# Patient Record
Sex: MALE | Race: WHITE | NOT HISPANIC OR LATINO | Employment: OTHER | URBAN - METROPOLITAN AREA
[De-identification: names, ages, dates, MRNs, and addresses within clinical notes are randomized per-mention and may not be internally consistent; named-entity substitution may affect disease eponyms.]

---

## 2023-11-06 PROBLEM — N40.1 BENIGN PROSTATIC HYPERPLASIA WITH NOCTURIA: Status: ACTIVE | Noted: 2023-11-06

## 2023-11-06 PROBLEM — B37.2 CANDIDA INFECTION OF FLEXURAL SKIN: Status: ACTIVE | Noted: 2023-11-06

## 2023-11-06 PROBLEM — R60.0 LEG EDEMA: Status: ACTIVE | Noted: 2023-11-06

## 2023-11-06 PROBLEM — E03.9 HYPOTHYROIDISM: Status: ACTIVE | Noted: 2023-11-06

## 2023-11-06 PROBLEM — L85.3 XEROSIS CUTIS: Status: ACTIVE | Noted: 2023-11-06

## 2023-11-06 PROBLEM — E11.9 CONTROLLED DIABETES MELLITUS (MULTI): Status: ACTIVE | Noted: 2023-11-06

## 2023-11-06 PROBLEM — E66.3 OVERWEIGHT: Status: ACTIVE | Noted: 2023-11-06

## 2023-11-06 PROBLEM — E55.9 MILD VITAMIN D DEFICIENCY: Status: ACTIVE | Noted: 2023-11-06

## 2023-11-06 PROBLEM — E78.5 HYPERLIPEMIA: Status: ACTIVE | Noted: 2023-11-06

## 2023-11-06 PROBLEM — G47.33 OBSTRUCTIVE SLEEP APNEA: Status: ACTIVE | Noted: 2023-11-06

## 2023-11-06 PROBLEM — M19.071 PRIMARY OSTEOARTHRITIS OF BOTH FEET: Status: ACTIVE | Noted: 2023-11-06

## 2023-11-06 PROBLEM — M19.072 PRIMARY OSTEOARTHRITIS OF BOTH FEET: Status: ACTIVE | Noted: 2023-11-06

## 2023-11-06 PROBLEM — B35.1 ONYCHOMYCOSIS: Status: ACTIVE | Noted: 2023-11-06

## 2023-11-06 PROBLEM — K25.9 PYLORIC ULCER: Status: ACTIVE | Noted: 2023-11-06

## 2023-11-06 PROBLEM — I48.91 ATRIAL FIBRILLATION (MULTI): Status: ACTIVE | Noted: 2023-11-06

## 2023-11-06 PROBLEM — R35.1 BENIGN PROSTATIC HYPERPLASIA WITH NOCTURIA: Status: ACTIVE | Noted: 2023-11-06

## 2023-11-06 PROBLEM — I25.10 CORONARY ARTERY CALCIFICATION: Status: ACTIVE | Noted: 2023-11-06

## 2023-11-06 PROBLEM — R63.2 POLYPHAGIA: Status: ACTIVE | Noted: 2023-11-06

## 2023-11-06 PROBLEM — J45.909 ASTHMA (HHS-HCC): Status: ACTIVE | Noted: 2023-11-06

## 2023-11-06 PROBLEM — L60.0 ONYCHOCRYPTOSIS: Status: ACTIVE | Noted: 2023-11-06

## 2023-11-06 PROBLEM — Z01.810 PREOP CARDIOVASCULAR EXAM: Status: ACTIVE | Noted: 2023-11-06

## 2023-11-06 PROBLEM — I25.84 CORONARY ARTERY CALCIFICATION: Status: ACTIVE | Noted: 2023-11-06

## 2023-11-06 PROBLEM — I10 BENIGN ESSENTIAL HYPERTENSION: Status: ACTIVE | Noted: 2023-11-06

## 2023-11-06 PROBLEM — R04.0 EPISTAXIS: Status: ACTIVE | Noted: 2023-11-06

## 2023-11-06 RX ORDER — LEVOTHYROXINE SODIUM 112 UG/1
1 TABLET ORAL DAILY
COMMUNITY
Start: 2022-01-10

## 2023-11-06 RX ORDER — METFORMIN HYDROCHLORIDE 500 MG/1
TABLET ORAL 2 TIMES DAILY
COMMUNITY
Start: 2016-11-18

## 2023-11-06 RX ORDER — LANOLIN ALCOHOL/MO/W.PET/CERES
1 CREAM (GRAM) TOPICAL DAILY
COMMUNITY
Start: 2022-04-19

## 2023-11-06 RX ORDER — METOPROLOL SUCCINATE 50 MG/1
50 TABLET, EXTENDED RELEASE ORAL DAILY
COMMUNITY
Start: 2022-01-10

## 2023-11-06 RX ORDER — FLUTICASONE PROPIONATE AND SALMETEROL 250; 50 UG/1; UG/1
1 POWDER RESPIRATORY (INHALATION) EVERY 12 HOURS
COMMUNITY
Start: 2021-06-30

## 2023-11-06 RX ORDER — PANTOPRAZOLE SODIUM 20 MG/1
1 TABLET, DELAYED RELEASE ORAL DAILY
COMMUNITY
Start: 2022-03-31 | End: 2023-11-07 | Stop reason: ALTCHOICE

## 2023-11-06 RX ORDER — PRAVASTATIN SODIUM 40 MG/1
1 TABLET ORAL DAILY
COMMUNITY
Start: 2022-10-18

## 2023-11-06 RX ORDER — GABAPENTIN 100 MG/1
1 CAPSULE ORAL NIGHTLY
COMMUNITY
End: 2023-12-06 | Stop reason: DRUGHIGH

## 2023-11-06 RX ORDER — TAMSULOSIN HYDROCHLORIDE 0.4 MG/1
0.4 CAPSULE ORAL
COMMUNITY
End: 2023-12-06 | Stop reason: ALTCHOICE

## 2023-11-06 RX ORDER — POLYETHYLENE GLYCOL 3350 17 G/17G
POWDER, FOR SOLUTION ORAL
COMMUNITY
Start: 2022-04-04 | End: 2023-11-07 | Stop reason: ALTCHOICE

## 2023-11-07 ENCOUNTER — OFFICE VISIT (OUTPATIENT)
Dept: CARDIOLOGY | Facility: CLINIC | Age: 73
End: 2023-11-07
Payer: MEDICARE

## 2023-11-07 VITALS
WEIGHT: 254 LBS | BODY MASS INDEX: 38.49 KG/M2 | SYSTOLIC BLOOD PRESSURE: 112 MMHG | HEART RATE: 87 BPM | HEIGHT: 68 IN | OXYGEN SATURATION: 96 % | DIASTOLIC BLOOD PRESSURE: 62 MMHG

## 2023-11-07 DIAGNOSIS — R07.9 CHEST PAIN, CENTRAL: ICD-10-CM

## 2023-11-07 DIAGNOSIS — E78.00 PURE HYPERCHOLESTEROLEMIA: ICD-10-CM

## 2023-11-07 DIAGNOSIS — G47.33 OBSTRUCTIVE SLEEP APNEA: Primary | ICD-10-CM

## 2023-11-07 DIAGNOSIS — I10 BENIGN ESSENTIAL HYPERTENSION: ICD-10-CM

## 2023-11-07 DIAGNOSIS — I25.84 CORONARY ARTERY CALCIFICATION: ICD-10-CM

## 2023-11-07 DIAGNOSIS — G47.33 OBSTRUCTIVE SLEEP APNEA: ICD-10-CM

## 2023-11-07 DIAGNOSIS — I48.21 PERMANENT ATRIAL FIBRILLATION (MULTI): Primary | ICD-10-CM

## 2023-11-07 DIAGNOSIS — I25.10 CORONARY ARTERY CALCIFICATION: ICD-10-CM

## 2023-11-07 PROCEDURE — 3074F SYST BP LT 130 MM HG: CPT | Performed by: INTERNAL MEDICINE

## 2023-11-07 PROCEDURE — 1159F MED LIST DOCD IN RCRD: CPT | Performed by: INTERNAL MEDICINE

## 2023-11-07 PROCEDURE — 1160F RVW MEDS BY RX/DR IN RCRD: CPT | Performed by: INTERNAL MEDICINE

## 2023-11-07 PROCEDURE — 1125F AMNT PAIN NOTED PAIN PRSNT: CPT | Performed by: INTERNAL MEDICINE

## 2023-11-07 PROCEDURE — 1036F TOBACCO NON-USER: CPT | Performed by: INTERNAL MEDICINE

## 2023-11-07 PROCEDURE — 99215 OFFICE O/P EST HI 40 MIN: CPT | Performed by: INTERNAL MEDICINE

## 2023-11-07 PROCEDURE — 3078F DIAST BP <80 MM HG: CPT | Performed by: INTERNAL MEDICINE

## 2023-11-07 RX ORDER — POTASSIUM CHLORIDE 750 MG/1
10 TABLET, EXTENDED RELEASE ORAL DAILY
COMMUNITY
Start: 2023-10-24

## 2023-11-07 RX ORDER — FUROSEMIDE 20 MG/1
20 TABLET ORAL DAILY
COMMUNITY
Start: 2017-10-12 | End: 2023-11-07 | Stop reason: SINTOL

## 2023-11-07 RX ORDER — TIRZEPATIDE 5 MG/.5ML
5 INJECTION, SOLUTION SUBCUTANEOUS
COMMUNITY
Start: 2023-10-16

## 2023-11-07 ASSESSMENT — PAIN SCALES - GENERAL: PAINLEVEL: 2

## 2023-11-07 NOTE — PROGRESS NOTES
"Chief Complaint:   ER f/u for chest discomfort     History Of Present Illness:    Brent Amaro is a 73 y.o. male presenting with cv dz.  To  ED 10/23   /23 with CP and diagnosed with PNE-started antibioitc  Still with central /L sided CP-both when laying down and walking  No SOB/cough    Has excess urination  Hard time wearing CPAP-feels tired in AM  No palp/dizziness/LH/edema    Activity limited     Last Recorded Vitals:  Vitals:    11/07/23 1309 11/07/23 1404   BP: 120/70 112/62   BP Location: Left arm    Patient Position: Sitting    BP Cuff Size: Adult    Pulse: 87    SpO2: 96%    Weight: 115 kg (254 lb)    Height: 1.727 m (5' 8\")             Allergies:  Patient has no known allergies.    Outpatient Medications:  Current Outpatient Medications   Medication Instructions    apixaban (Eliquis) 5 mg tablet 1 tablet, oral, 2 times daily    fluticasone propion-salmeteroL (Advair Diskus) 250-50 mcg/dose diskus inhaler 1 puff, inhalation, Every 12 hours    gabapentin (Neurontin) 100 mg capsule 1 capsule, oral, Nightly    levothyroxine (Synthroid, Levoxyl) 112 mcg tablet 1 tablet, oral, Daily    magnesium oxide (Mag-Ox) 400 mg (241.3 mg magnesium) tablet 1 tablet, oral, Daily    metFORMIN (Glucophage) 500 mg tablet oral, 2 times daily    metoprolol succinate XL (Toprol-XL) 50 mg 24 hr tablet oral    Mounjaro 5 mg, subcutaneous, Weekly    potassium chloride CR 10 mEq ER tablet 10 mEq, oral, Daily    pravastatin (Pravachol) 40 mg tablet 1 tablet, oral, Daily    tamsulosin (Flomax) 0.4 mg 24 hr capsule oral       Physical Exam:  Constitutional:       Appearance: Not in distress. Obese.   Neck:      Vascular: No JVR. JVD normal.   Pulmonary:      Effort: Pulmonary effort is normal.      Breath sounds: Normal breath sounds. No wheezing. No rhonchi. No rales.   Chest:      Chest wall: Not tender to palpatation.   Cardiovascular:      PMI at left midclavicular line. Normal rate. Regularly irregular rhythm. Normal S1. Normal S2. "       Murmurs: There is no murmur.      No gallop.  No click. No rub.   Pulses:     Intact distal pulses.   Edema:     Peripheral edema absent.   Abdominal:      General: Abdomen is protuberant. Bowel sounds are normal.      Palpations: Abdomen is soft.      Tenderness: There is no abdominal tenderness.   Musculoskeletal: Normal range of motion.         General: No tenderness. Skin:     General: Skin is warm and dry.   Neurological:      General: No focal deficit present.      Mental Status: Alert and oriented to person, place and time.          Last Labs:  CBC -  Lab Results   Component Value Date    WBC 8.3 01/03/2020    HGB 13.8 01/03/2020    HCT 42.7 01/03/2020    MCV 97 01/03/2020     01/03/2020       CMP -  Lab Results   Component Value Date    CALCIUM 9.0 01/03/2020    PROT 6.6 01/03/2020    ALBUMIN 3.9 01/03/2020    AST 22 01/03/2020    ALT 16 01/03/2020    ALKPHOS 52 01/03/2020    BILITOT 0.5 01/03/2020       LIPID PANEL -   Lab Results   Component Value Date    CHOL 133 01/03/2020    TRIG 136 01/03/2020    HDL 32.1 (A) 01/03/2020    CHHDL 4.1 01/03/2020    LDLF 74 01/03/2020    VLDL 27 01/03/2020       RENAL FUNCTION PANEL -   Lab Results   Component Value Date    GLUCOSE 112 (H) 01/03/2020     (L) 01/03/2020    K 4.5 01/03/2020     01/03/2020    CO2 25 01/03/2020    ANIONGAP 12 01/03/2020    BUN 15 01/03/2020    CREATININE 0.87 01/03/2020    CALCIUM 9.0 01/03/2020    ALBUMIN 3.9 01/03/2020        Lab Results   Component Value Date    HGBA1C 5.7 01/03/2020           Lab review: I have personally reviewed the laboratory result(s)       Problem List Items Addressed This Visit       Obstructive sleep apnea    Overview     Not tolerating CPAP well  Refer to sleep medicine         Atrial fibrillation (CMS/HCC) - Primary    Overview     Pt noted to be in afib of uncertain duration on 10/2017 hospital admit.Subsequently had DCCV 12/22/17 but back in afib f/u OV. Pt not aware he is in afib and  he is rate controlled. Suspect afib permanent at this point(severe bi-atrial enlargement on prior echo). Rate control and anticoagulation (on Eliquis).          Benign essential hypertension    Overview     BP OK on current meds--did not tolerate ARB because of low BP.-discussed weight loss         Coronary artery calcification    Overview     Noted on 9/2019 chest CT.   Now with chest pain  Repeat stress test  On Eliquis/Statin. Note no obstructive CAD on 2013 cath.         Hyperlipidemia    Overview     On high intensity statin(atorvastatin 40 mg daily)but 12/2020 lipids with LDL=74.As he has DM/cornary calcium increased to 80 mg daily but developoed leg pain-reurned to 40 mg daily.  4/2022 LDL=58  Pt had additional leg pain   changed to pravastatin  8/2022 LDL=94-follow HH diet         Chest pain, central    Overview     10/23/23 ED eval negative for cardiac etiology-was diagnosed with PNE and treated but still with CP(both at rest and with activity)  As has hz of CAC proceed with Lexiscan stress test                Nikunj Knutson, DO

## 2023-11-27 ENCOUNTER — ANCILLARY PROCEDURE (OUTPATIENT)
Dept: RADIOLOGY | Facility: CLINIC | Age: 73
End: 2023-11-27
Payer: MEDICARE

## 2023-11-27 ENCOUNTER — HOSPITAL ENCOUNTER (OUTPATIENT)
Dept: CARDIOLOGY | Facility: CLINIC | Age: 73
Discharge: HOME | End: 2023-11-27
Payer: MEDICARE

## 2023-11-27 VITALS
WEIGHT: 254 LBS | BODY MASS INDEX: 38.49 KG/M2 | SYSTOLIC BLOOD PRESSURE: 112 MMHG | DIASTOLIC BLOOD PRESSURE: 62 MMHG | HEIGHT: 68 IN

## 2023-11-27 DIAGNOSIS — I48.91 UNSPECIFIED ATRIAL FIBRILLATION (MULTI): ICD-10-CM

## 2023-11-27 DIAGNOSIS — I25.10 CAD (CORONARY ARTERY DISEASE): Primary | ICD-10-CM

## 2023-11-27 DIAGNOSIS — R07.9 CHEST PAIN, CENTRAL: ICD-10-CM

## 2023-11-27 DIAGNOSIS — I25.10 CORONARY ARTERY CALCIFICATION: ICD-10-CM

## 2023-11-27 DIAGNOSIS — I25.84 CORONARY ARTERY CALCIFICATION: ICD-10-CM

## 2023-11-27 LAB
AORTIC VALVE MEAN GRADIENT: 6
AORTIC VALVE PEAK VELOCITY: 1.75
AV PEAK GRADIENT: 12.3
AVA (PEAK VEL): 1.85
AVA (VTI): 2
EJECTION FRACTION APICAL 4 CHAMBER: 71.2
EJECTION FRACTION: 68
LEFT VENTRICLE INTERNAL DIMENSION DIASTOLE: 4.88 (ref 3.5–6)
LEFT VENTRICULAR OUTFLOW TRACT DIAMETER: 2.01
RIGHT VENTRICLE FREE WALL PEAK S': 1.1
RIGHT VENTRICLE PEAK SYSTOLIC PRESSURE: 52.5

## 2023-11-27 PROCEDURE — 2500000004 HC RX 250 GENERAL PHARMACY W/ HCPCS (ALT 636 FOR OP/ED): Performed by: INTERNAL MEDICINE

## 2023-11-27 PROCEDURE — 78452 HT MUSCLE IMAGE SPECT MULT: CPT

## 2023-11-27 PROCEDURE — 93306 TTE W/DOPPLER COMPLETE: CPT

## 2023-11-27 PROCEDURE — 93016 CV STRESS TEST SUPVJ ONLY: CPT | Performed by: INTERNAL MEDICINE

## 2023-11-27 PROCEDURE — 93017 CV STRESS TEST TRACING ONLY: CPT

## 2023-11-27 PROCEDURE — 78452 HT MUSCLE IMAGE SPECT MULT: CPT | Performed by: INTERNAL MEDICINE

## 2023-11-27 PROCEDURE — 93306 TTE W/DOPPLER COMPLETE: CPT | Performed by: INTERNAL MEDICINE

## 2023-11-27 RX ORDER — REGADENOSON 0.08 MG/ML
0.4 INJECTION, SOLUTION INTRAVENOUS ONCE
Status: COMPLETED | OUTPATIENT
Start: 2023-11-27 | End: 2023-11-27

## 2023-11-27 RX ADMIN — REGADENOSON 0.4 MG: 0.08 INJECTION, SOLUTION INTRAVENOUS at 10:20

## 2023-11-30 PROBLEM — Z20.822 EXPOSURE TO COVID-19 VIRUS: Status: ACTIVE | Noted: 2023-11-30

## 2023-11-30 PROBLEM — K62.89 RECTAL PAIN: Status: ACTIVE | Noted: 2023-11-30

## 2023-11-30 PROBLEM — N40.1 BENIGN PROSTATIC HYPERPLASIA WITH URINARY OBSTRUCTION: Status: ACTIVE | Noted: 2023-11-30

## 2023-11-30 PROBLEM — J18.9 LLL PNEUMONIA: Status: ACTIVE | Noted: 2023-11-03

## 2023-11-30 PROBLEM — I10 HTN (HYPERTENSION): Status: ACTIVE | Noted: 2023-11-03

## 2023-11-30 PROBLEM — M25.569 KNEE PAIN: Status: ACTIVE | Noted: 2023-11-30

## 2023-11-30 PROBLEM — E66.9 OBESITY WITH BODY MASS INDEX (BMI) OF 30.0 TO 39.9: Status: ACTIVE | Noted: 2023-11-03

## 2023-11-30 PROBLEM — E11.9 DIABETES MELLITUS (MULTI): Status: RESOLVED | Noted: 2023-11-06 | Resolved: 2023-11-30

## 2023-11-30 PROBLEM — N13.8 BENIGN PROSTATIC HYPERPLASIA WITH URINARY OBSTRUCTION: Status: ACTIVE | Noted: 2023-11-30

## 2023-11-30 PROBLEM — R35.0 FREQUENT URINATION: Status: ACTIVE | Noted: 2023-11-30

## 2023-11-30 PROBLEM — E66.3 OVERWEIGHT: Status: RESOLVED | Noted: 2023-11-06 | Resolved: 2023-11-30

## 2023-11-30 PROBLEM — N40.1 BENIGN PROSTATIC HYPERPLASIA WITH URINARY OBSTRUCTION: Status: RESOLVED | Noted: 2023-11-30 | Resolved: 2023-11-30

## 2023-11-30 PROBLEM — M79.672 LEFT FOOT PAIN: Status: ACTIVE | Noted: 2023-11-30

## 2023-11-30 PROBLEM — I50.30 DIASTOLIC CHF (MULTI): Status: ACTIVE | Noted: 2023-11-03

## 2023-11-30 PROBLEM — R91.8 LUNG MASS: Status: ACTIVE | Noted: 2023-11-30

## 2023-11-30 PROBLEM — D64.9 ANEMIA: Status: ACTIVE | Noted: 2023-11-30

## 2023-11-30 PROBLEM — N13.8 BENIGN PROSTATIC HYPERPLASIA WITH URINARY OBSTRUCTION: Status: RESOLVED | Noted: 2023-11-30 | Resolved: 2023-11-30

## 2023-11-30 PROBLEM — R55 SYNCOPE: Status: ACTIVE | Noted: 2023-11-30

## 2023-11-30 PROBLEM — M54.50 LOWER BACK PAIN: Status: ACTIVE | Noted: 2023-11-30

## 2023-11-30 PROBLEM — R79.89 LOW VITAMIN D LEVEL: Status: ACTIVE | Noted: 2023-11-30

## 2023-12-04 ENCOUNTER — APPOINTMENT (OUTPATIENT)
Dept: CARDIOLOGY | Facility: CLINIC | Age: 73
End: 2023-12-04
Payer: MEDICARE

## 2023-12-06 ENCOUNTER — OFFICE VISIT (OUTPATIENT)
Dept: CARDIOLOGY | Facility: CLINIC | Age: 73
End: 2023-12-06
Payer: MEDICARE

## 2023-12-06 VITALS
SYSTOLIC BLOOD PRESSURE: 105 MMHG | BODY MASS INDEX: 37.46 KG/M2 | DIASTOLIC BLOOD PRESSURE: 65 MMHG | WEIGHT: 246.4 LBS | OXYGEN SATURATION: 98 % | HEART RATE: 74 BPM

## 2023-12-06 DIAGNOSIS — R07.9 CHEST PAIN, CENTRAL: ICD-10-CM

## 2023-12-06 DIAGNOSIS — I10 BENIGN ESSENTIAL HYPERTENSION: ICD-10-CM

## 2023-12-06 DIAGNOSIS — E78.00 PURE HYPERCHOLESTEROLEMIA: ICD-10-CM

## 2023-12-06 DIAGNOSIS — I48.21 PERMANENT ATRIAL FIBRILLATION (MULTI): Primary | ICD-10-CM

## 2023-12-06 DIAGNOSIS — G47.33 OBSTRUCTIVE SLEEP APNEA: ICD-10-CM

## 2023-12-06 DIAGNOSIS — I25.84 CORONARY ARTERY CALCIFICATION: ICD-10-CM

## 2023-12-06 DIAGNOSIS — I27.20 PULMONARY HYPERTENSION (MULTI): ICD-10-CM

## 2023-12-06 DIAGNOSIS — I25.10 CORONARY ARTERY CALCIFICATION: ICD-10-CM

## 2023-12-06 PROCEDURE — 3074F SYST BP LT 130 MM HG: CPT | Performed by: INTERNAL MEDICINE

## 2023-12-06 PROCEDURE — 4010F ACE/ARB THERAPY RXD/TAKEN: CPT | Performed by: INTERNAL MEDICINE

## 2023-12-06 PROCEDURE — 1160F RVW MEDS BY RX/DR IN RCRD: CPT | Performed by: INTERNAL MEDICINE

## 2023-12-06 PROCEDURE — 1125F AMNT PAIN NOTED PAIN PRSNT: CPT | Performed by: INTERNAL MEDICINE

## 2023-12-06 PROCEDURE — 99214 OFFICE O/P EST MOD 30 MIN: CPT | Performed by: INTERNAL MEDICINE

## 2023-12-06 PROCEDURE — 1036F TOBACCO NON-USER: CPT | Performed by: INTERNAL MEDICINE

## 2023-12-06 PROCEDURE — 3078F DIAST BP <80 MM HG: CPT | Performed by: INTERNAL MEDICINE

## 2023-12-06 PROCEDURE — 1159F MED LIST DOCD IN RCRD: CPT | Performed by: INTERNAL MEDICINE

## 2023-12-06 RX ORDER — LISINOPRIL 5 MG/1
5 TABLET ORAL DAILY
COMMUNITY
Start: 2023-10-18

## 2023-12-06 RX ORDER — GABAPENTIN 300 MG/1
300 CAPSULE ORAL DAILY
COMMUNITY
Start: 2023-11-29

## 2023-12-06 RX ORDER — LANCETS 33 GAUGE
EACH MISCELLANEOUS
COMMUNITY
Start: 2017-11-06

## 2023-12-06 RX ORDER — ALBUTEROL SULFATE 90 UG/1
AEROSOL, METERED RESPIRATORY (INHALATION)
COMMUNITY
Start: 2023-11-06

## 2023-12-06 RX ORDER — BLOOD-GLUCOSE METER
EACH MISCELLANEOUS
COMMUNITY
Start: 2023-10-04

## 2023-12-06 RX ORDER — ICOSAPENT ETHYL 1 G/1
2 CAPSULE ORAL
COMMUNITY
Start: 2023-10-05

## 2023-12-06 RX ORDER — ERGOCALCIFEROL 1.25 MG/1
50000 CAPSULE ORAL
COMMUNITY
Start: 2023-11-29

## 2023-12-06 RX ORDER — DEXTROSE 4 G
TABLET,CHEWABLE ORAL
COMMUNITY
Start: 2017-11-06

## 2023-12-06 RX ORDER — FLUTICASONE PROPIONATE 50 MCG
SPRAY, SUSPENSION (ML) NASAL
COMMUNITY

## 2023-12-06 NOTE — PROGRESS NOTES
Chief Complaint:   Results (NM stress test and echo)     History Of Present Illness:    Brent Amaro is a 73 y.o. male presenting for stress test f/u.  Patient denies chest pain/SOB/palpitations/dizziness/lightheadedness/edema/claudication    Intermittent cough  Not exercising   No bleeding with Eliquis   Last Recorded Vitals:  Vitals:    12/06/23 1230 12/06/23 1331   BP: 110/76 105/65   BP Location: Left arm    Patient Position: Sitting    Pulse: 74    SpO2: 98%    Weight: 112 kg (246 lb 6.4 oz)             Allergies:  Patient has no known allergies.    Outpatient Medications:  Current Outpatient Medications   Medication Instructions    albuterol 90 mcg/actuation inhaler     apixaban (Eliquis) 5 mg tablet 1 tablet, oral, 2 times daily    blood-glucose meter (OneTouch Ultra2 Meter) misc     coenzyme Q10 100 mg, oral, Daily    ergocalciferol (VITAMIN D-2) 50,000 Units, oral, Weekly    fluticasone (Flonase) 50 mcg/actuation nasal spray USE 2 SPRAYS NASALLY DAILY WITH BREAKFAST    fluticasone propion-salmeteroL (Advair Diskus) 250-50 mcg/dose diskus inhaler 1 puff, inhalation, Every 12 hours    folic acid/multivit-min/lutein (CENTRUM SILVER ORAL) 1 tablet, oral, Daily    gabapentin (NEURONTIN) 300 mg, oral, Daily    icosapent ethyL (VASCEPA) 2 g, oral, 2 times daily with meals    lancets 33 gauge misc     levothyroxine (Synthroid, Levoxyl) 112 mcg tablet 1 tablet, oral, Daily    lisinopril 5 mg, oral, Daily    magnesium oxide (Mag-Ox) 400 mg (241.3 mg magnesium) tablet 1 tablet, oral, Daily    metFORMIN (Glucophage) 500 mg tablet oral, 2 times daily    metoprolol succinate XL (TOPROL-XL) 50 mg, oral, Daily    Mounjaro 5 mg, subcutaneous, Weekly    NON FORMULARY cpap machine, See merrick Tyler sleep apnea 1 IVAN, Date: 6/18/22 6:20:00 AM EDT, Instructions Replace Required Details, Supply    NON FORMULARY cpap mask and hose, See merrick Tyler sleep apnea 1 IVAN, Date: 10/25/23 11:26:00 AM EDT, Instructions Replace  Required Details, Supply    OneTouch Verio test strips strip     potassium chloride CR 10 mEq ER tablet 10 mEq, oral, Daily    pravastatin (Pravachol) 40 mg tablet 1 tablet, oral, Daily       Physical Exam:  Constitutional:       Appearance: Not in distress. Obese.   Neck:      Vascular: No JVR. JVD normal.   Pulmonary:      Effort: Pulmonary effort is normal.      Breath sounds: Normal breath sounds. No wheezing. No rhonchi. No rales.   Chest:      Chest wall: Not tender to palpatation.   Cardiovascular:      PMI at left midclavicular line. Normal rate. Irregularly irregular rhythm. Normal S1. Normal S2.       Murmurs: There is no murmur.      No gallop.  No click. No rub.   Pulses:     Intact distal pulses.   Edema:     Peripheral edema absent.   Abdominal:      General: Abdomen is protuberant. Bowel sounds are normal.      Palpations: Abdomen is soft.      Tenderness: There is no abdominal tenderness.   Musculoskeletal: Normal range of motion.         General: No tenderness. Skin:     General: Skin is warm and dry.   Neurological:      General: No focal deficit present.      Mental Status: Alert and oriented to person, place and time.          Last Labs:  CBC -  Lab Results   Component Value Date    WBC 8.3 01/03/2020    HGB 13.8 01/03/2020    HCT 42.7 01/03/2020    MCV 97 01/03/2020     01/03/2020       CMP -  Lab Results   Component Value Date    CALCIUM 9.0 01/03/2020    PROT 6.6 01/03/2020    ALBUMIN 3.9 01/03/2020    AST 22 01/03/2020    ALT 16 01/03/2020    ALKPHOS 52 01/03/2020    BILITOT 0.5 01/03/2020       LIPID PANEL -   Lab Results   Component Value Date    CHOL 133 01/03/2020    TRIG 136 01/03/2020    HDL 32.1 (A) 01/03/2020    CHHDL 4.1 01/03/2020    LDLF 74 01/03/2020    VLDL 27 01/03/2020       RENAL FUNCTION PANEL -   Lab Results   Component Value Date    GLUCOSE 112 (H) 01/03/2020     (L) 01/03/2020    K 4.5 01/03/2020     01/03/2020    CO2 25 01/03/2020    ANIONGAP 12  01/03/2020    BUN 15 01/03/2020    CREATININE 0.87 01/03/2020    CALCIUM 9.0 01/03/2020    ALBUMIN 3.9 01/03/2020        Lab Results   Component Value Date    HGBA1C 5.7 01/03/2020           Lab review: I have personally reviewed the laboratory result(s)       Problem List Items Addressed This Visit       Obstructive sleep apnea    Overview     Not tolerating CPAP well  Refer to sleep medicine         Atrial fibrillation (CMS/HCC) - Primary    Overview     Pt noted to be in afib of uncertain duration on 10/2017 hospital admit.Subsequently had DCCV 12/22/17 but back in afib f/u OV. Pt not aware he is in afib and he is rate controlled. Suspect afib permanent at this point(severe bi-atrial enlargement on prior echo). Rate control and anticoagulation (on Eliquis).          Benign essential hypertension    Overview     BP OK on current meds--did not tolerate ARB because of low BP.-discussed weight loss         Coronary artery calcification    Overview     Noted on 9/2019 chest CT.   !1/2023 stress test NL  On Eliquis/Statin. Note no obstructive CAD on 2013 cath.         Hyperlipidemia    Overview     On high intensity statin(atorvastatin 40 mg daily)but 12/2020 lipids with LDL=74.As he has DM/cornary calcium increased to 80 mg daily but developoed leg pain-reurned to 40 mg daily.  4/2022 LDL=58  Pt had additional leg pain   changed to pravastatin  8/2022 LDL=94-follow HH diet         Chest pain, central    Overview     Resolved  11/2023 stress test NL         Pulmonary hypertension (CMS/HCC)    Overview     Moderate per 11/2023 echo  Has untreated sleep apnea-see sleep medicine            Weight loss  See sleep medicine=untreated sleep apnea    Nikunj Knutson, DO

## 2024-02-06 ENCOUNTER — OFFICE VISIT (OUTPATIENT)
Dept: SLEEP MEDICINE | Facility: CLINIC | Age: 74
End: 2024-02-06
Payer: MEDICARE

## 2024-02-06 VITALS
HEIGHT: 68 IN | RESPIRATION RATE: 16 BRPM | TEMPERATURE: 97.2 F | BODY MASS INDEX: 37.49 KG/M2 | OXYGEN SATURATION: 100 % | HEART RATE: 69 BPM | WEIGHT: 247.38 LBS | SYSTOLIC BLOOD PRESSURE: 124 MMHG | DIASTOLIC BLOOD PRESSURE: 74 MMHG

## 2024-02-06 DIAGNOSIS — G47.33 OBSTRUCTIVE SLEEP APNEA: ICD-10-CM

## 2024-02-06 PROCEDURE — 3074F SYST BP LT 130 MM HG: CPT | Performed by: PHYSICIAN ASSISTANT

## 2024-02-06 PROCEDURE — 1159F MED LIST DOCD IN RCRD: CPT | Performed by: PHYSICIAN ASSISTANT

## 2024-02-06 PROCEDURE — 99214 OFFICE O/P EST MOD 30 MIN: CPT | Performed by: PHYSICIAN ASSISTANT

## 2024-02-06 PROCEDURE — 3078F DIAST BP <80 MM HG: CPT | Performed by: PHYSICIAN ASSISTANT

## 2024-02-06 PROCEDURE — 1160F RVW MEDS BY RX/DR IN RCRD: CPT | Performed by: PHYSICIAN ASSISTANT

## 2024-02-06 PROCEDURE — 4010F ACE/ARB THERAPY RXD/TAKEN: CPT | Performed by: PHYSICIAN ASSISTANT

## 2024-02-06 PROCEDURE — 1126F AMNT PAIN NOTED NONE PRSNT: CPT | Performed by: PHYSICIAN ASSISTANT

## 2024-02-06 PROCEDURE — 1036F TOBACCO NON-USER: CPT | Performed by: PHYSICIAN ASSISTANT

## 2024-02-06 SDOH — ECONOMIC STABILITY: INCOME INSECURITY: IN THE LAST 12 MONTHS, WAS THERE A TIME WHEN YOU WERE NOT ABLE TO PAY THE MORTGAGE OR RENT ON TIME?: NO

## 2024-02-06 SDOH — ECONOMIC STABILITY: HOUSING INSECURITY
IN THE LAST 12 MONTHS, WAS THERE A TIME WHEN YOU DID NOT HAVE A STEADY PLACE TO SLEEP OR SLEPT IN A SHELTER (INCLUDING NOW)?: NO

## 2024-02-06 SDOH — ECONOMIC STABILITY: FOOD INSECURITY: WITHIN THE PAST 12 MONTHS, THE FOOD YOU BOUGHT JUST DIDN'T LAST AND YOU DIDN'T HAVE MONEY TO GET MORE.: NEVER TRUE

## 2024-02-06 SDOH — ECONOMIC STABILITY: FOOD INSECURITY: WITHIN THE PAST 12 MONTHS, YOU WORRIED THAT YOUR FOOD WOULD RUN OUT BEFORE YOU GOT MONEY TO BUY MORE.: NEVER TRUE

## 2024-02-06 ASSESSMENT — LIFESTYLE VARIABLES
AUDIT-C TOTAL SCORE: 0
HOW MANY STANDARD DRINKS CONTAINING ALCOHOL DO YOU HAVE ON A TYPICAL DAY: PATIENT DOES NOT DRINK
HOW OFTEN DO YOU HAVE A DRINK CONTAINING ALCOHOL: NEVER
HOW OFTEN DO YOU HAVE SIX OR MORE DRINKS ON ONE OCCASION: NEVER
SKIP TO QUESTIONS 9-10: 1

## 2024-02-06 ASSESSMENT — ENCOUNTER SYMPTOMS
OCCASIONAL FEELINGS OF UNSTEADINESS: 0
LOSS OF SENSATION IN FEET: 0
DEPRESSION: 0

## 2024-02-06 ASSESSMENT — PAIN SCALES - GENERAL: PAINLEVEL: 0-NO PAIN

## 2024-02-06 ASSESSMENT — COLUMBIA-SUICIDE SEVERITY RATING SCALE - C-SSRS
2. HAVE YOU ACTUALLY HAD ANY THOUGHTS OF KILLING YOURSELF?: NO
6. HAVE YOU EVER DONE ANYTHING, STARTED TO DO ANYTHING, OR PREPARED TO DO ANYTHING TO END YOUR LIFE?: NO
1. IN THE PAST MONTH, HAVE YOU WISHED YOU WERE DEAD OR WISHED YOU COULD GO TO SLEEP AND NOT WAKE UP?: NO

## 2024-02-06 ASSESSMENT — PATIENT HEALTH QUESTIONNAIRE - PHQ9
2. FEELING DOWN, DEPRESSED OR HOPELESS: NOT AT ALL
SUM OF ALL RESPONSES TO PHQ9 QUESTIONS 1 AND 2: 0
1. LITTLE INTEREST OR PLEASURE IN DOING THINGS: NOT AT ALL

## 2024-02-06 NOTE — ASSESSMENT & PLAN NOTE
-having issues with pap intolerance  -did not bring pap to clinic today or after last appointment  -as noted in HPI, some adjustments were made remotely and he continues to experience issue  -he also wants a new device as his is >5 years old; he may do better also on autopap settings; it is unclear as to why he has a bpap device   -agreeable to updated sleep study. Split night study at [facility] has been ordered.  -using [facility] currently but states he would like to switch; will confirm with patient that he would like to proceed with RePAP through [facility] after sleep study

## 2024-02-06 NOTE — PROGRESS NOTES
" Patient: Brent Amaro    [id number]  : 1950 -- AGE 73 y.o.    Provider: Miriam George PA-C     Location [facility]   Service Date: 2024              [facility]  Followup Visit Note    HISTORY OF PRESENT ILLNESS     HISTORY OF PRESENT ILLNESS   Brent Amaro is a 73 y.o. male with h/o CHRISTY, obesity, HTN who presents to a [facility] for followup.     Assessment and plan from last visit: 2022     OBSTRUCTIVE SLEEP APNEA  -DL requested from [facility]; adjust device as necessary and will call patient   -Patient signed record release for sleep study copy today; sent to [facility]   -Patient did not bring cpap to clinic; unable to trouble shoot today --> call [facility] and have them help adjust humidity/etc to help with issue of feeling \"too much water\", try running without water  -bring cpap to clinic to troubleshoot at next appointment, recommend 2-3 months   -Sleep apnea and PAP therapy education was provided at length in clinic today.   -Diet, exercise, and weight loss were emphasized today in clinic, as were non-supine sleep, avoiding alcohol in the late evening, and driving or operating heavy machinery when sleepy.     OBESITY  -BMI: ~41  -most recent bicarb: 25  -encouraged healthy diet/exercise  -**weight management referral was placed today     HYPERTENSION  -Blood pressure today: 130/87  -Denies CP, SOB, unusual HA, vision changes, dizziness or other symptoms   -Continue f/u with PCP and cardiology         RTC in ~2-3 months; instructed to bring device to clinic         Current History    Note -after last visit, patient did not follow up in office, I made some adjustments to his device pressure settings. Has a BPAP device currently and cannot do auto cpap settings. He was at fixed cpap of 14 and tried cpap 10.     On today's visit, the patient reports he continues to experience difficulty with pap use, does " not feel pressure settings are corret, did not bring device with him to clinic. Feels the air that blows out of the device is too cold. States he tried adjusting the temperature/humidity settings without improvement. He reports his current device is >5 years old and would like to proceed with a new one. Is open to undergoing a new sleep study. Does state in past when he used pap regularly his sleep felt more restful; currenlty he has taken several months off cpap due to the above mentioned issues but would like to get back to regular use on a new device he states.       REVIEW OF SYSTEMS     REVIEW OF SYSTEMS  See HPI; all other ROS were reviewed and negative for compliant      ALLERGIES AND MEDICATIONS     ALLERGIES  No Known Allergies    MEDICATIONS: He has a current medication list which includes the following prescription(s): albuterol, apixaban - Take 1 tablet (5 mg) by mouth 2 times a day, blood-glucose meter, coenzyme q10 - Chew 100 mg once daily, ergocalciferol - Take 1 capsule (50,000 Units) by mouth 1 (one) time per week, fluticasone - USE 2 SPRAYS NASALLY DAILY WITH BREAKFAST, fluticasone propion-salmeterol - Inhale 1 puff every 12 hours, folic acid/multivit-min/lutein - Take 1 tablet by mouth once daily, gabapentin - Take 1 capsule (300 mg) by mouth once daily, icosapent ethyl - Take 2 capsules (2 g) by mouth 2 times a day with meals, lancets, levothyroxine - Take 1 tablet (112 mcg) by mouth once daily, lisinopril - Take 1 tablet (5 mg) by mouth once daily, magnesium oxide - Take 1 tablet (400 mg) by mouth once daily, metformin - Take by mouth twice a day, metoprolol succinate xl - Take 1 tablet (50 mg) by mouth once daily, mounjaro - Inject 5 mg under the skin 1 (one) time per week, NON FORMULARY - cpap machine, See Instructions, [facility] sleep apnea, 1 EA, Date: 6/18/22 6:20:00 AM EDT, Instructions Replace Required Details, Supply, NON FORMULARY - cpap mask and hose, See merrick Tyler sleep apnea, 1 EA,  "Date: 10/25/23 11:26:00 AM EDT, Instructions Replace Required Details, Supply, onetouch verio test strips, potassium chloride cr - Take 1 tablet (10 mEq) by mouth once daily, and pravastatin - Take 1 tablet (40 mg) by mouth once daily, and the following Facility-Administered Medications: tc-99m tetrofosmin and tc-99m tetrofosmin.    PAST MEDICAL HISTORY : He  has a past medical history of Other specified soft tissue disorders, Personal history of other diseases of the circulatory system, Personal history of other endocrine, nutritional and metabolic disease (12/07/2017), Personal history of other endocrine, nutritional and metabolic disease, Personal history of other endocrine, nutritional and metabolic disease, and Personal history of peptic ulcer disease.    PAST SURGICAL HISTORY: He  has a past surgical history that includes Other surgical history (04/19/2022); Other surgical history (04/18/2022); Other surgical history (04/19/2022); Other surgical history (10/18/2022); and Other surgical history (09/20/2021).     FAMILY HISTORY: No changes since previous visit. Otherwise non-contributory as charted.     SOCIAL HISTORY  He  reports that he has never smoked. He has never used smokeless tobacco. He reports that he does not drink alcohol and does not use drugs.       PHYSICAL EXAM     VITAL SIGNS: /74   Pulse 69   Temp 36.2 °C (97.2 °F) (Temporal)   Resp 16   Ht 1.727 m (5' 8\")   Wt 112 kg (247 lb 6 oz)   SpO2 100%   BMI 37.61 kg/m²        PREVIOUS WEIGHTS:  Wt Readings from Last 3 Encounters:   02/06/24 112 kg (247 lb 6 oz)   12/06/23 112 kg (246 lb 6.4 oz)   11/27/23 115 kg (254 lb)       Constitutional: Alert and oriented, cooperative, no acute distress  Head: Normocephalic, atraumatic   Cranial Features: No abnormal craniofacial features  Neck: Supple. Trachea midline.  Pulmonary: Non-labored breathing, speaks in full sentences. No cough.    Cardiac: regular rate   Extremities: No clubbing, no " edema  Neuromuscular: Cranial nerves grossly intact, no focal deficits      RESULTS/DATA     Bicarbonate (mmol/L)   Date Value   01/03/2020 25   09/06/2019 26   02/09/2019 26       PAP Adherence  N/A    ASSESSMENT/PLAN     Mr. Amaro is a 73 y.o. male and he returns in followup to the [facility] for CHRISTY.    Problem List, Orders, Assessment, Recommendations:  Problem List Items Addressed This Visit             ICD-10-CM    Obstructive sleep apnea G47.33     -having issues with pap intolerance  -did not bring pap to clinic today or after last appointment  -as noted in HPI, some adjustments were made remotely and he continues to experience issue  -he also wants a new device as his is >5 years old; he may do better also on autopap settings; it is unclear as to why he has a bpap device   -agreeable to updated sleep study. Split night study at [place] lab has been ordered.  -using [facility] [place] currently but states he would like to switch; will confirm with patient that he would like to proceed with RePAP through [facility] after sleep study          Relevant Orders    In-Center Sleep Study (Sleep Provider Only)       Disposition  -call after results of sleep study; get new device ordered  -currently using [facility] [place], would like to switch DME.

## 2024-03-26 ENCOUNTER — APPOINTMENT (OUTPATIENT)
Dept: SLEEP MEDICINE | Facility: HOSPITAL | Age: 74
End: 2024-03-26
Payer: MEDICARE

## 2024-06-25 ENCOUNTER — OFFICE VISIT (OUTPATIENT)
Dept: SLEEP MEDICINE | Facility: CLINIC | Age: 74
End: 2024-06-25
Payer: MEDICARE

## 2024-06-25 VITALS
OXYGEN SATURATION: 97 % | TEMPERATURE: 97.5 F | DIASTOLIC BLOOD PRESSURE: 75 MMHG | HEIGHT: 68 IN | RESPIRATION RATE: 16 BRPM | WEIGHT: 250.13 LBS | SYSTOLIC BLOOD PRESSURE: 111 MMHG | BODY MASS INDEX: 37.91 KG/M2 | HEART RATE: 64 BPM

## 2024-06-25 DIAGNOSIS — E66.9 OBESITY WITH BODY MASS INDEX (BMI) OF 30.0 TO 39.9: ICD-10-CM

## 2024-06-25 DIAGNOSIS — I10 BENIGN ESSENTIAL HYPERTENSION: ICD-10-CM

## 2024-06-25 DIAGNOSIS — G47.33 OBSTRUCTIVE SLEEP APNEA: Primary | ICD-10-CM

## 2024-06-25 PROCEDURE — G2211 COMPLEX E/M VISIT ADD ON: HCPCS | Performed by: PHYSICIAN ASSISTANT

## 2024-06-25 PROCEDURE — 1159F MED LIST DOCD IN RCRD: CPT | Performed by: PHYSICIAN ASSISTANT

## 2024-06-25 PROCEDURE — 99214 OFFICE O/P EST MOD 30 MIN: CPT | Performed by: PHYSICIAN ASSISTANT

## 2024-06-25 PROCEDURE — 1126F AMNT PAIN NOTED NONE PRSNT: CPT | Performed by: PHYSICIAN ASSISTANT

## 2024-06-25 PROCEDURE — 3078F DIAST BP <80 MM HG: CPT | Performed by: PHYSICIAN ASSISTANT

## 2024-06-25 PROCEDURE — 3074F SYST BP LT 130 MM HG: CPT | Performed by: PHYSICIAN ASSISTANT

## 2024-06-25 PROCEDURE — 4010F ACE/ARB THERAPY RXD/TAKEN: CPT | Performed by: PHYSICIAN ASSISTANT

## 2024-06-25 PROCEDURE — 1036F TOBACCO NON-USER: CPT | Performed by: PHYSICIAN ASSISTANT

## 2024-06-25 ASSESSMENT — ENCOUNTER SYMPTOMS
OCCASIONAL FEELINGS OF UNSTEADINESS: 0
DEPRESSION: 0
LOSS OF SENSATION IN FEET: 0

## 2024-06-25 ASSESSMENT — PATIENT HEALTH QUESTIONNAIRE - PHQ9
SUM OF ALL RESPONSES TO PHQ9 QUESTIONS 1 AND 2: 0
1. LITTLE INTEREST OR PLEASURE IN DOING THINGS: NOT AT ALL
2. FEELING DOWN, DEPRESSED OR HOPELESS: NOT AT ALL

## 2024-06-25 ASSESSMENT — PAIN SCALES - GENERAL: PAINLEVEL: 0-NO PAIN

## 2024-06-25 NOTE — ASSESSMENT & PLAN NOTE
-bicarb 25 in 1/2020   -bmi 38  -working on weight loss- healthy diet, starting to incorporate exercise/walking

## 2024-06-25 NOTE — ASSESSMENT & PLAN NOTE
-did not complete study/worried about cost/ no record on file for old sleep study; has bpap, unclear why bpap over cpap  -have made numerous adjustments and now to CPAP 8 - toelrated well in clinic  -review acclimation techniques/tolerating well here with [facility]  -if still having difficulty on follow up; will encourage to have sleep study done

## 2024-06-25 NOTE — PROGRESS NOTES
Patient: Brent Amaro    [id number]  : 1950 -- AGE 74 y.o.    Provider: Miriam George PA-C     Location [facility] [employer] [facility]   Service Date: 2024              [facility]  Followup Visit Note    HISTORY OF PRESENT ILLNESS     HISTORY OF PRESENT ILLNESS   Brent Amaro is a 74 y.o. male with h/o CHRISTY, obesity and HTN who presents to a [facility] for followup.     PAST SLEEP HISTORY:  Originally diagnosed in early  and states has had numerous studies over the years   States he has had multiple sleep studies in the past but states his most recent one was in ?2020 through [facility] - he is unsure of severity   -has bpap device; unclear of reason for this over cpap         Assessment and plan from last visit: 2024--   Obstructive sleep apnea G47.33        -having issues with pap intolerance  -did not bring pap to clinic today or after last appointment  -as noted in HPI, some adjustments were made remotely and he continues to experience issue  -he also wants a new device as his is >5 years old; he may do better also on autopap settings; it is unclear as to why he has a bpap device   -agreeable to updated sleep study. Split night study at [facility] has been ordered.  -using [facility] currently but states he would like to switch; will confirm with patient that he would like to proceed with RePAP through [facility] after sleep study            Relevant Orders     In-Center Sleep Study (Sleep Provider Only)         Disposition  -call after results of sleep study; get new device ordered  -currently using [facility], would like to switch DME.    Current History    On today's visit, the patient reports he is here today to see if PAP device can be adjusted. Feels pressure are too high. Last time at clinic we sett pressure to fixed cpap of 10. He has a BPAP device.     He has not used device in about 2 months. He did not have his ordered  sleep study.     We last adjusted to CPAP 10. Today we adjusted to CPAP 8. He tolerated well in clinic.    Had ordered split night study to update diagnosis/help determine proper pressure settings but patient did not have the study completed; states that he is worried about cost. Unclear why he was on bpap in first place.      Will give cpap 8 a try and then reconsider study if still not tolerating or florinda not controlled  Todays visit there is no data to review, has not used in 2 months he reports.       In bed: 10P  Subjective sleep latency: right away   Awakenings during night: nocturia - 4-5 times, prostate issues   Length of awakenings: can be prolonged - may turn TV on   Final awakening time: 9-9:30AM  Overall estimate of total sleep time: 8 hours        ESS:  1  DEEP:  2  FOSQ: 40    REVIEW OF SYSTEMS     REVIEW OF SYSTEMS  See HPI; all other ROS were reviewed and negative for compliant      ALLERGIES AND MEDICATIONS     ALLERGIES  No Known Allergies    MEDICATIONS: He has a current medication list which includes the following prescription(s): albuterol, apixaban - Take 1 tablet (5 mg) by mouth 2 times a day, blood-glucose meter, coenzyme q10 - Chew 100 mg once daily, ergocalciferol - Take 1 capsule (50,000 Units) by mouth 1 (one) time per week, fluticasone - USE 2 SPRAYS NASALLY DAILY WITH BREAKFAST, fluticasone propion-salmeterol - Inhale 1 puff every 12 hours, folic acid/multivit-min/lutein - Take 1 tablet by mouth once daily, gabapentin - Take 1 capsule (300 mg) by mouth once daily, icosapent ethyl - Take 2 capsules (2 g) by mouth 2 times daily (morning and late afternoon), lancets, levothyroxine - Take 1 tablet (112 mcg) by mouth once daily, lisinopril - Take 1 tablet (5 mg) by mouth once daily, magnesium oxide - Take 1 tablet (400 mg) by mouth once daily, metformin - Take by mouth twice a day, metoprolol succinate xl - Take 1 tablet (50 mg) by mouth once daily, mounjaro - Inject 5 mg under the skin 1 (one)  "time per week, NON FORMULARY - cpap machine, See Instructionsmerrick sleep apnea, 1 EA, Date: 6/18/22 6:20:00 AM EDT, Instructions Replace Required Details, Supply, NON FORMULARY - cpap mask and hose, See Instructionsmer[facility] sleep apnea, 1 EA, Date: 10/25/23 11:26:00 AM EDT, Instructions Replace Required Details, Supply, onetouch verio test strips, potassium chloride cr - Take 1 tablet (10 mEq) by mouth once daily, and pravastatin - Take 1 tablet (40 mg) by mouth once daily, and the following Facility-Administered Medications: tc-99m tetrofosmin and tc-99m tetrofosmin.    PAST MEDICAL HISTORY : He  has a past medical history of Other specified soft tissue disorders, Personal history of other diseases of the circulatory system, Personal history of other endocrine, nutritional and metabolic disease (12/07/2017), Personal history of other endocrine, nutritional and metabolic disease, Personal history of other endocrine, nutritional and metabolic disease, and Personal history of peptic ulcer disease.    PAST SURGICAL HISTORY: He  has a past surgical history that includes Other surgical history (04/19/2022); Other surgical history (04/18/2022); Other surgical history (04/19/2022); Other surgical history (10/18/2022); and Other surgical history (09/20/2021).     FAMILY HISTORY: No changes since previous visit. Otherwise non-contributory as charted.     SOCIAL HISTORY  He  reports that he has never smoked. He has never used smokeless tobacco. He reports that he does not drink alcohol and does not use drugs.       PHYSICAL EXAM     VITAL SIGNS: /75   Pulse 64   Temp 36.4 °C (97.5 °F) (Temporal)   Resp 16   Ht 1.727 m (5' 8\")   Wt 113 kg (250 lb 2 oz)   SpO2 97%   BMI 38.03 kg/m²        PREVIOUS WEIGHTS:  Wt Readings from Last 3 Encounters:   06/25/24 113 kg (250 lb 2 oz)   02/06/24 112 kg (247 lb 6 oz)   12/06/23 112 kg (246 lb 6.4 oz)       Constitutional: Alert and oriented, cooperative, no acute distress  Head: " Normocephalic, atraumatic   Cranial Features: No abnormal craniofacial features  Neck: Supple. Trachea midline.  Pulmonary: Non-labored breathing, speaks in full sentences. No cough.    Cardiac: regular rate   Extremities: No clubbing, no edema  Neuromuscular: Cranial nerves grossly intact, no focal deficits      RESULTS/DATA     Bicarbonate (mmol/L)   Date Value   01/03/2020 25   09/06/2019 26   02/09/2019 26       PAP Adherence  No use in 2 months     ASSESSMENT/PLAN     Mr. Amaro is a 74 y.o. male and he returns in followup to the [facility] for CHRISTY.    Problem List, Orders, Assessment, Recommendations:  Problem List Items Addressed This Visit             ICD-10-CM    Obstructive sleep apnea - Primary G47.33     -did not complete study/worried about cost/ no record on file for old sleep study; has bpap, unclear why bpap over cpap  -have made numerous adjustments and now to CPAP 8 - toelrated well in clinic  -review acclimation techniques/tolerating well here with [facility]  -if still having difficulty on follow up; will encourage to have sleep study done          Benign essential hypertension I10     -controlled at 111/75 today  -asymptomatic  -continue pcp follow up         Obesity with body mass index (BMI) of 30.0 to 39.9 E66.9     -bicarb 25 in 1/2020   -bmi 38  -working on weight loss- healthy diet, starting to incorporate exercise/walking              Disposition    Return to clinic in 3-4 months with all equipment

## 2024-10-24 ENCOUNTER — APPOINTMENT (OUTPATIENT)
Dept: PODIATRY | Facility: CLINIC | Age: 74
End: 2024-10-24
Payer: MEDICARE

## 2025-01-09 ENCOUNTER — APPOINTMENT (OUTPATIENT)
Dept: PODIATRY | Facility: CLINIC | Age: 75
End: 2025-01-09
Payer: MEDICARE

## 2025-01-29 ENCOUNTER — APPOINTMENT (OUTPATIENT)
Dept: PODIATRY | Facility: CLINIC | Age: 75
End: 2025-01-29
Payer: MEDICARE

## 2025-07-16 PROBLEM — E66.812 CLASS 2 OBESITY WITH BODY MASS INDEX (BMI) OF 38.0 TO 38.9 IN ADULT: Status: ACTIVE | Noted: 2023-11-03

## 2025-07-16 PROBLEM — B37.2 CANDIDA INFECTION OF FLEXURAL SKIN: Status: RESOLVED | Noted: 2023-11-06 | Resolved: 2025-07-16

## 2025-07-16 PROBLEM — E11.42 DIABETIC PERIPHERAL NEUROPATHY ASSOCIATED WITH TYPE 2 DIABETES MELLITUS: Status: ACTIVE | Noted: 2022-07-12

## 2025-08-12 ENCOUNTER — APPOINTMENT (OUTPATIENT)
Dept: CARDIOLOGY | Facility: CLINIC | Age: 75
End: 2025-08-12
Payer: MEDICARE

## 2025-08-18 ENCOUNTER — OFFICE VISIT (OUTPATIENT)
Dept: CARDIOLOGY | Facility: CLINIC | Age: 75
End: 2025-08-18
Payer: MEDICARE

## 2025-08-18 VITALS
WEIGHT: 236 LBS | BODY MASS INDEX: 35.88 KG/M2 | SYSTOLIC BLOOD PRESSURE: 112 MMHG | OXYGEN SATURATION: 96 % | HEART RATE: 61 BPM | DIASTOLIC BLOOD PRESSURE: 70 MMHG

## 2025-08-18 DIAGNOSIS — I10 BENIGN ESSENTIAL HYPERTENSION: Primary | ICD-10-CM

## 2025-08-18 DIAGNOSIS — I25.10 CORONARY ARTERY CALCIFICATION: ICD-10-CM

## 2025-08-18 DIAGNOSIS — I27.20 PULMONARY HYPERTENSION (MULTI): ICD-10-CM

## 2025-08-18 DIAGNOSIS — I48.91 ATRIAL FIBRILLATION, UNSPECIFIED TYPE (MULTI): ICD-10-CM

## 2025-08-18 DIAGNOSIS — E66.812 CLASS 2 OBESITY WITH BODY MASS INDEX (BMI) OF 38.0 TO 38.9 IN ADULT, UNSPECIFIED OBESITY TYPE, UNSPECIFIED WHETHER SERIOUS COMORBIDITY PRESENT: ICD-10-CM

## 2025-08-18 DIAGNOSIS — E78.00 PURE HYPERCHOLESTEROLEMIA: ICD-10-CM

## 2025-08-18 DIAGNOSIS — G47.33 OBSTRUCTIVE SLEEP APNEA: ICD-10-CM

## 2025-08-18 PROBLEM — Z01.810 PREOP CARDIOVASCULAR EXAM: Status: RESOLVED | Noted: 2023-11-06 | Resolved: 2025-08-18

## 2025-08-18 PROBLEM — R07.9 CHEST PAIN, CENTRAL: Status: RESOLVED | Noted: 2023-11-07 | Resolved: 2025-08-18

## 2025-08-18 PROBLEM — M19.079 OSTEOARTHRITIS OF FOOT: Status: ACTIVE | Noted: 2025-08-18

## 2025-08-18 PROCEDURE — 1036F TOBACCO NON-USER: CPT | Performed by: INTERNAL MEDICINE

## 2025-08-18 PROCEDURE — 4010F ACE/ARB THERAPY RXD/TAKEN: CPT | Performed by: INTERNAL MEDICINE

## 2025-08-18 PROCEDURE — 1160F RVW MEDS BY RX/DR IN RCRD: CPT | Performed by: INTERNAL MEDICINE

## 2025-08-18 PROCEDURE — 3074F SYST BP LT 130 MM HG: CPT | Performed by: INTERNAL MEDICINE

## 2025-08-18 PROCEDURE — 3078F DIAST BP <80 MM HG: CPT | Performed by: INTERNAL MEDICINE

## 2025-08-18 PROCEDURE — 93005 ELECTROCARDIOGRAM TRACING: CPT | Performed by: INTERNAL MEDICINE

## 2025-08-18 PROCEDURE — 1159F MED LIST DOCD IN RCRD: CPT | Performed by: INTERNAL MEDICINE

## 2025-08-18 PROCEDURE — G2211 COMPLEX E/M VISIT ADD ON: HCPCS | Performed by: INTERNAL MEDICINE

## 2025-08-18 PROCEDURE — 99214 OFFICE O/P EST MOD 30 MIN: CPT | Performed by: INTERNAL MEDICINE

## 2025-08-18 PROCEDURE — 93010 ELECTROCARDIOGRAM REPORT: CPT | Performed by: INTERNAL MEDICINE

## 2025-08-18 RX ORDER — TAMSULOSIN HYDROCHLORIDE 0.4 MG/1
0.4 CAPSULE ORAL DAILY
COMMUNITY

## 2025-08-18 RX ORDER — METOPROLOL SUCCINATE 50 MG/1
25 TABLET, EXTENDED RELEASE ORAL DAILY
Qty: 45 TABLET | Refills: 3 | Status: SHIPPED | OUTPATIENT
Start: 2025-08-18

## 2025-08-19 LAB
Q ONSET: 222 MS
QRS COUNT: 10 BEATS
QRS DURATION: 88 MS
QT INTERVAL: 410 MS
QTC CALCULATION(BAZETT): 412 MS
QTC FREDERICIA: 412 MS
R AXIS: 61 DEGREES
T AXIS: 49 DEGREES
T OFFSET: 427 MS
VENTRICULAR RATE: 61 BPM

## 2025-09-08 ENCOUNTER — APPOINTMENT (OUTPATIENT)
Dept: CARDIOLOGY | Facility: CLINIC | Age: 75
End: 2025-09-08
Payer: MEDICARE